# Patient Record
Sex: MALE | Race: WHITE | Employment: FULL TIME | URBAN - METROPOLITAN AREA
[De-identification: names, ages, dates, MRNs, and addresses within clinical notes are randomized per-mention and may not be internally consistent; named-entity substitution may affect disease eponyms.]

---

## 2017-03-30 ENCOUNTER — HOSPITAL ENCOUNTER (OUTPATIENT)
Dept: NON INVASIVE DIAGNOSTICS | Age: 57
Discharge: HOME | End: 2017-03-30
Attending: INTERNAL MEDICINE | Admitting: INTERNAL MEDICINE
Payer: COMMERCIAL

## 2017-03-30 ENCOUNTER — APPOINTMENT (OUTPATIENT)
Dept: GENERAL RADIOLOGY | Age: 57
End: 2017-03-30
Attending: INTERNAL MEDICINE
Payer: COMMERCIAL

## 2017-03-30 ENCOUNTER — ANESTHESIA (OUTPATIENT)
Dept: CARDIAC CATH/INVASIVE PROCEDURES | Age: 57
End: 2017-03-30
Payer: COMMERCIAL

## 2017-03-30 ENCOUNTER — ANESTHESIA EVENT (OUTPATIENT)
Dept: CARDIAC CATH/INVASIVE PROCEDURES | Age: 57
End: 2017-03-30
Payer: COMMERCIAL

## 2017-03-30 VITALS
SYSTOLIC BLOOD PRESSURE: 122 MMHG | DIASTOLIC BLOOD PRESSURE: 74 MMHG | BODY MASS INDEX: 31.69 KG/M2 | WEIGHT: 234 LBS | OXYGEN SATURATION: 94 % | HEART RATE: 60 BPM | TEMPERATURE: 97.9 F | HEIGHT: 72 IN | RESPIRATION RATE: 12 BRPM

## 2017-03-30 PROCEDURE — 74011250636 HC RX REV CODE- 250/636: Performed by: INTERNAL MEDICINE

## 2017-03-30 PROCEDURE — 77030018836 HC SOL IRR NACL ICUM -A

## 2017-03-30 PROCEDURE — 74011250636 HC RX REV CODE- 250/636

## 2017-03-30 PROCEDURE — 77030010507 HC ADH SKN DERMBND J&J -B

## 2017-03-30 PROCEDURE — C1892 INTRO/SHEATH,FIXED,PEEL-AWAY: HCPCS

## 2017-03-30 PROCEDURE — 77030018729 HC ELECTRD DEFIB PAD CARD -B

## 2017-03-30 PROCEDURE — L3650 SO 8 ABD RESTRAINT PRE OTS: HCPCS

## 2017-03-30 PROCEDURE — 77030011640 HC PAD GRND REM COVD -A

## 2017-03-30 PROCEDURE — C1898 LEAD, PMKR, OTHER THAN TRANS: HCPCS

## 2017-03-30 PROCEDURE — 76060000032 HC ANESTHESIA 0.5 TO 1 HR

## 2017-03-30 PROCEDURE — 77030002996 HC SUT SLK J&J -A

## 2017-03-30 PROCEDURE — 71010 XR CHEST PORT: CPT

## 2017-03-30 PROCEDURE — 74011000250 HC RX REV CODE- 250

## 2017-03-30 PROCEDURE — 74011250637 HC RX REV CODE- 250/637: Performed by: INTERNAL MEDICINE

## 2017-03-30 PROCEDURE — L3670 SO ACRO/CLAV CAN WEB PRE OTS: HCPCS

## 2017-03-30 PROCEDURE — 77030031139 HC SUT VCRL2 J&J -A

## 2017-03-30 PROCEDURE — C1894 INTRO/SHEATH, NON-LASER: HCPCS

## 2017-03-30 PROCEDURE — C1785 PMKR, DUAL, RATE-RESP: HCPCS

## 2017-03-30 PROCEDURE — 33208 INSRT HEART PM ATRIAL & VENT: CPT

## 2017-03-30 RX ORDER — CEFAZOLIN SODIUM 1 G/3ML
INJECTION, POWDER, FOR SOLUTION INTRAMUSCULAR; INTRAVENOUS
Status: DISCONTINUED
Start: 2017-03-30 | End: 2017-03-30 | Stop reason: HOSPADM

## 2017-03-30 RX ORDER — MIDAZOLAM HYDROCHLORIDE 1 MG/ML
INJECTION, SOLUTION INTRAMUSCULAR; INTRAVENOUS AS NEEDED
Status: DISCONTINUED | OUTPATIENT
Start: 2017-03-30 | End: 2017-03-30 | Stop reason: HOSPADM

## 2017-03-30 RX ORDER — ACETAMINOPHEN 325 MG/1
650 TABLET ORAL
Status: DISCONTINUED | OUTPATIENT
Start: 2017-03-30 | End: 2017-03-30 | Stop reason: HOSPADM

## 2017-03-30 RX ORDER — BACITRACIN 50000 [IU]/1
50000 INJECTION, POWDER, FOR SOLUTION INTRAMUSCULAR ONCE
Status: COMPLETED | OUTPATIENT
Start: 2017-03-30 | End: 2017-03-30

## 2017-03-30 RX ORDER — LIDOCAINE HYDROCHLORIDE AND EPINEPHRINE 10; 10 MG/ML; UG/ML
1-20 INJECTION, SOLUTION INFILTRATION; PERINEURAL
Status: DISCONTINUED | OUTPATIENT
Start: 2017-03-30 | End: 2017-03-30 | Stop reason: HOSPADM

## 2017-03-30 RX ORDER — MIDAZOLAM HYDROCHLORIDE 1 MG/ML
1-5 INJECTION, SOLUTION INTRAMUSCULAR; INTRAVENOUS
Status: DISCONTINUED | OUTPATIENT
Start: 2017-03-30 | End: 2017-03-30 | Stop reason: HOSPADM

## 2017-03-30 RX ORDER — FENTANYL CITRATE 50 UG/ML
12.5-5 INJECTION, SOLUTION INTRAMUSCULAR; INTRAVENOUS
Status: DISCONTINUED | OUTPATIENT
Start: 2017-03-30 | End: 2017-03-30 | Stop reason: HOSPADM

## 2017-03-30 RX ORDER — SODIUM CHLORIDE 9 MG/ML
INJECTION, SOLUTION INTRAVENOUS
Status: DISCONTINUED | OUTPATIENT
Start: 2017-03-30 | End: 2017-03-30 | Stop reason: HOSPADM

## 2017-03-30 RX ORDER — BACITRACIN 50000 [IU]/1
INJECTION, POWDER, FOR SOLUTION INTRAMUSCULAR
Status: COMPLETED
Start: 2017-03-30 | End: 2017-03-30

## 2017-03-30 RX ORDER — PROPOFOL 10 MG/ML
INJECTION, EMULSION INTRAVENOUS
Status: DISCONTINUED | OUTPATIENT
Start: 2017-03-30 | End: 2017-03-30 | Stop reason: HOSPADM

## 2017-03-30 RX ORDER — HYDROCODONE BITARTRATE AND ACETAMINOPHEN 5; 300 MG/1; MG/1
1 TABLET ORAL
Qty: 8 TAB | Refills: 0 | Status: SHIPPED | OUTPATIENT
Start: 2017-03-30 | End: 2018-09-03

## 2017-03-30 RX ORDER — LIDOCAINE HYDROCHLORIDE AND EPINEPHRINE 10; 10 MG/ML; UG/ML
INJECTION, SOLUTION INFILTRATION; PERINEURAL
Status: COMPLETED
Start: 2017-03-30 | End: 2017-03-30

## 2017-03-30 RX ORDER — CEFAZOLIN SODIUM IN 0.9 % NACL 2 G/100 ML
2 PLASTIC BAG, INJECTION (ML) INTRAVENOUS
Status: COMPLETED | OUTPATIENT
Start: 2017-03-30 | End: 2017-03-30

## 2017-03-30 RX ORDER — HEPARIN SODIUM 200 [USP'U]/100ML
500 INJECTION, SOLUTION INTRAVENOUS ONCE
Status: COMPLETED | OUTPATIENT
Start: 2017-03-30 | End: 2017-03-30

## 2017-03-30 RX ORDER — FENTANYL CITRATE 50 UG/ML
INJECTION, SOLUTION INTRAMUSCULAR; INTRAVENOUS AS NEEDED
Status: DISCONTINUED | OUTPATIENT
Start: 2017-03-30 | End: 2017-03-30 | Stop reason: HOSPADM

## 2017-03-30 RX ORDER — HEPARIN SODIUM 200 [USP'U]/100ML
INJECTION, SOLUTION INTRAVENOUS
Status: COMPLETED
Start: 2017-03-30 | End: 2017-03-30

## 2017-03-30 RX ORDER — HYDROCODONE BITARTRATE AND ACETAMINOPHEN 5; 300 MG/1; MG/1
1 TABLET ORAL
Qty: 8 TAB | Refills: 0 | OUTPATIENT
Start: 2017-03-30 | End: 2017-03-30

## 2017-03-30 RX ADMIN — MIDAZOLAM HYDROCHLORIDE 2 MG: 1 INJECTION, SOLUTION INTRAMUSCULAR; INTRAVENOUS at 13:48

## 2017-03-30 RX ADMIN — FENTANYL CITRATE 25 MCG: 50 INJECTION, SOLUTION INTRAMUSCULAR; INTRAVENOUS at 13:58

## 2017-03-30 RX ADMIN — FENTANYL CITRATE 25 MCG: 50 INJECTION, SOLUTION INTRAMUSCULAR; INTRAVENOUS at 14:05

## 2017-03-30 RX ADMIN — FENTANYL CITRATE 50 MCG: 50 INJECTION, SOLUTION INTRAMUSCULAR; INTRAVENOUS at 14:10

## 2017-03-30 RX ADMIN — BACITRACIN 50000 UNITS: 5000 INJECTION, POWDER, FOR SOLUTION INTRAMUSCULAR at 14:29

## 2017-03-30 RX ADMIN — BACITRACIN 50000 UNITS: 50000 INJECTION, POWDER, FOR SOLUTION INTRAMUSCULAR at 14:29

## 2017-03-30 RX ADMIN — MIDAZOLAM HYDROCHLORIDE 1 MG: 1 INJECTION, SOLUTION INTRAMUSCULAR; INTRAVENOUS at 13:53

## 2017-03-30 RX ADMIN — LIDOCAINE HYDROCHLORIDE,EPINEPHRINE BITARTRATE 30 MG: 10; .01 INJECTION, SOLUTION INFILTRATION; PERINEURAL at 14:10

## 2017-03-30 RX ADMIN — SODIUM CHLORIDE: 9 INJECTION, SOLUTION INTRAVENOUS at 13:44

## 2017-03-30 RX ADMIN — LIDOCAINE HYDROCHLORIDE AND EPINEPHRINE 30 MG: 10; 10 INJECTION, SOLUTION INFILTRATION; PERINEURAL at 14:10

## 2017-03-30 RX ADMIN — PROPOFOL 50 MCG/KG/MIN: 10 INJECTION, EMULSION INTRAVENOUS at 13:53

## 2017-03-30 RX ADMIN — HEPARIN SODIUM 1000 UNITS: 200 INJECTION, SOLUTION INTRAVENOUS at 13:57

## 2017-03-30 RX ADMIN — MIDAZOLAM HYDROCHLORIDE 2 MG: 1 INJECTION, SOLUTION INTRAMUSCULAR; INTRAVENOUS at 14:12

## 2017-03-30 RX ADMIN — CEFAZOLIN 2 G: 10 INJECTION, POWDER, FOR SOLUTION INTRAVENOUS; PARENTERAL at 13:49

## 2017-03-30 RX ADMIN — ACETAMINOPHEN 650 MG: 325 TABLET ORAL at 17:06

## 2017-03-30 NOTE — PROCEDURES
[address]  [phone]    Patient ID:  Patient: Vicente Elliott  MRN: [id number]  Age: 64 y.o.  : 1960  Gender: male  Study Date: 3/30/2017    History: This is a male with symptomatic sinus node dysfunction that is not reversible. He is here for permanent pacemaker implant. Procedures Performed:  1. DUAL CHAMBER PACEMAKER ()    The patient was brought to the EP lab in a postabsorptive state after informed consent had been previously obtained. Continuous electrocardiographic and hemodynamic monitoring was performed. Sedation was performed by the anesthesiologist who was in constant attendance throughout the procedure. Using 1% lidocaine with epinephrine, the left chest site was anesthetized. The pocket was formed in the usual fashion and ultimately axillary venous access was obtained using a micropuncture needle. Two safe sheaths were placed. The right ventricular lead (5086-58 cm) was advanced to the RV apex. The right atrial lead (5086-52 cm) was advanced to the RA appendage. Each lead was fixed and tested, performance verified. The leads were anchored to the pocket floor using two 0-silk sutures at each anchor sleeve. The pulse generator was connected to the leads and placed in the pocket after hemostasis was confirmed. Vigorous irrigation with antibiotic solution was performed. A single 0-silk suture was used to anchor the pulse generator to the pocket floor. The pocket was closed using a running 2-0 Vicryl layer x1, followed by a more superficial layer of running 4-0 Vicryl in a subcuticular fashion to close the skin. Final fluoroscopic check revealed adequate redundancy of the leads and the absence of pneumothorax. Dermabond was applied. Preoperative Diagnosis: As above. Postoperative Diagnosis: As above. Procedure:  As above.   Surgeon(s) and Role:  Oziel Perdomo MD - Primary   Anesthesia:   MAC by the anesthesiologist.  Estimated Blood Loss:  <5 cc. Specimens: * No specimens in log *   Findings:  As below. Complications:  None. SETTINGS:  Medtronic [id number], [id number] (Advisa DR JESSICA Jhaveri)  RA 2.2, 1.0, 935  RV 10.5, 0.6, 1181  AAIR-DDDR     Recommendations:  After successful dual chamber permanent pacemaker implant to the left chest using transvenous leads, routine follow-up in 2-4 weeks for wound and device check. MRI compatibility begins in 6 weeks assuming normal system function.

## 2017-03-30 NOTE — PROGRESS NOTES
TRANSFER - OUT REPORT:    Verbal report given to JANAY Smith(name) on Raffaele Dewitt  being transferred to IVCU(unit) for routine progression of care       Report consisted of patients Situation, Background, Assessment and   Recommendations(SBAR). Information from the following report(s) Procedure Summary and MAR was reviewed with the receiving nurse. Lines:   Peripheral IV 03/30/17 Left Forearm (Active)        Opportunity for questions and clarification was provided.       Patient transported with:   Registered Nurse

## 2017-03-30 NOTE — H&P
[place] [facility], [address]   HISTORY AND PHYSICAL       Name:  Femi Wong   MR#:  [id number]   :  1960   Account #:  [de-identified]        Date of Adm:  2017       CHIEF COMPLAINT: Shortness of breath. HISTORY OF PRESENT ILLNESS: This is a 66-year-old male with a   history of bradycardia, dyslipidemia, active tobacco smoking, and   possible early diabetes mellitus. He was seen in the hospital in   2016 and was noted to have periods of bradycardia. His   resting heart rate was in the 40s beats per minute and with activity   reached up to the 70s beats per minute. At that time he had a right   facial palsy that was confirmed to not be a stroke. This was believed to   be a Bell's palsy. This resolved. He saw me in the office in 2017 and at that time showed   signs that he was having symptomatic bradycardia. A Holter monitor   showed indeed that he was bradycardic and so we made a decision to   pursue a permanent pacemaker. He has continued to have some dyspnea on exertion. Rare dizziness. Sometimes feels a fluctuating sensation in his head. No olya syncope. No chest pain, orthopnea, paroxysmal nocturnal dyspnea, or edema. Sleeps on 1 pillow comfortably. ALLERGIES: NO KNOWN DRUG ALLERGIES. MEDICATIONS: None. PAST MEDICAL HISTORY     1. As above. 2. Slightly elevated transaminases, seen in the past.     FAMILY HISTORY: Noncontributory. SOCIAL HISTORY: Tobacco smoker. . Active at work. REVIEW OF SYSTEMS   A complete review of systems is obtained from the patient, except as   noted above, is otherwise noncontributory. PHYSICAL EXAMINATION   VITAL SIGNS: Pulse 46. Blood pressure 136/69, temperature 98.2   Fahrenheit, respirations 17, oxygen saturation 94% on room air. GENERAL: Nondiaphoretic, not in acute distress, middle-aged male. NECK: Supple, no palpable thyromegaly. HEENT: No scleral icterus, mucous membranes are moist.   Conjunctivae are pink. RESPIRATORY: Unlabored, clear to auscultation bilaterally, symmetric   air movement. CARDIAC: Regular, but bradycardic. No murmur, rub or gallop. No   peripheral edema. Palpable radial pulses bilaterally. ABDOMEN: Soft, nontender, nondistended. EXTREMITIES: No cyanosis or clubbing. NEUROLOGIC: Awake, appropriate, grossly nonfocal. No resting   tremor. TELEMETRY: Sinus bradycardia in the 40s-50s beats per minute   range. IMPRESSION     1. Symptomatic bradycardia, believed to be due to sinus node   dysfunction. This is not reversible. 2. Nicotine dependence, cigarettes. 3. Dyslipidemia. 4. Possible early diabetes mellitus. RECOMMENDATIONS    1. Given the potential benefits, risks, and alternatives, he agrees to   proceed with a dual-chamber permanent pacemaker. 2. Smoking cessation counseling when appropriate. MD Whit Poole / Chuck Turk   D:  03/30/2017   15:00   T:  03/30/2017   15:22   Job #:  [id number]

## 2017-03-30 NOTE — PROGRESS NOTES
Pt ambulated in the lindo with no signs of distress or c/o pain. Pts left chest site dry and intact with no signs of hematoma. Will continue to monitor.

## 2017-03-30 NOTE — PROGRESS NOTES
TRANSFER - IN REPORT:    Verbal report received from Yoly Kirk RN lab(name[facility]  being received from EP lab(unit) for routine progression of care      Report consisted of patients Situation, Background, Assessment and   Recommendations(SBAR). Information from the following report(s) Procedure Summary and MAR was reviewed with the receiving nurse. Opportunity for questions and clarification was provided. Assessment completed upon patients arrival to unit and care assumed.

## 2017-03-30 NOTE — PROGRESS NOTES
Cardiac Cath Lab Recovery Arrival Note:      Timothy Hernández arrived to Cardiac Cath Lab, Recovery Area. Staff introduced to patient. Patient identifiers verified with NAME and DATE OF BIRTH. Procedure verified with patient. Consent forms reviewed and signed by patient or authorized representative and verified. Allergies verified. Patient and family oriented to department. Patient and family informed of procedure and plan of care. Questions answered with review. Patient prepped for procedure, per orders from physician, prior to arrival.    Patient on cardiac monitor, non-invasive blood pressure, SPO2 monitor. On room air. Patient is A&Ox 3. Patient reports no c/o. Patient in stretcher, in low position, with side rails up, call bell within reach, patient instructed to call if assistance as needed. Patient prep in: [address], [place]. Patient family has pager # none  Family in: [facility] waiting area.    Prep by: Hali Dias RN

## 2017-03-30 NOTE — IP AVS SNAPSHOT
[address] [place] [facility]. 
[phone] Patient: Dariel Colmenares MRN: [id number] XQO:[birth year] You are allergic to the following No active allergies Recent Documentation Height Weight BMI Smoking Status 1.829 m 106.1 kg 31.74 kg/m2 Current Every Day Smoker Emergency Contacts Name Discharge Info Relation Home Work Mobile [address] CAREGIVER [3] Spouse [3]   [phone] About your hospitalization You were admitted on:  March 30, 2017 You last received care in the:  [facility] You were discharged on:  March 30, 2017 Unit phone number:  [phone] Why you were hospitalized Your primary diagnosis was:  Not on File Providers Seen During Your Hospitalizations Provider Role Specialty Primary office phone Candice Kumar MD Attending Provider Cardiology [phone] Your Primary Care Physician (PCP) Primary Care Physician Office Phone Office Fax Ashwin Gilbert [phone] Follow-up Information Follow up With Details Comments Contact Info Yani Stearns NP   [address] [address] [phone] Current Discharge Medication List  
  
START taking these medications Dose & Instructions Dispensing Information Comments Morning Noon Evening Bedtime HYDROcodone-acetaminophen 5-300 mg tablet Commonly known as:  Ning Forte Your last dose was: Your next dose is:    
   
   
 Dose:  1 Tab Take 1 Tab by mouth every six (6) hours as needed. Max Daily Amount: 4 Tabs. Quantity:  8 Tab Refills:  0 Where to Get Your Medications Information on where to get these meds will be given to you by the nurse or doctor. ! Ask your nurse or doctor about these medications HYDROcodone-acetaminophen 5-300 mg tablet Discharge Instructions DISCHARGE INSTRUCTIONS FOR PATIENTS WITH PACEMAKERS You had a dual chamber permanent pacemaker (Medtronic) placed by Dr. Ephraim Mercedes on 3/30/2017. This was due to a slow heart rate problem called \"sinus node dysfunction\" that was causing you symptoms. 1. Remember to call for an appointment in 2-4 weeks [phone] to check healing and implant programming with Dr. Claritza Mejias nurse. 2. Medic Alert Bracelets are available from your pharmacist to wear at all times if you choose to wear one. 3. Carry your ID card for pacemaker with you at all times. This card will be given to you in the hospital or mailed to you. 4. The pacemaker will bulge slightly under your skin. The bulge will decrease in size over the next few weeks. Please notify the doctor's office if you notice any of the following around your site: A.  A bruise that does not go away. B.  Soreness or yellow, green, or brown drainage from the site. C. Any swelling from the site. D. If you have a fever of 100 degrees or higher that lasts for a few days. INCISION CARE 1.  Leave skin glue over your site until it starts to fall off, usually in a few weeks. 2.  You may shower after 3 days as long as your incision isnt submerged or directly sprayed upon until well healed. 3.  For comfort, wear loose fitting clothing. 4.  Report any signs of infection, fever, pain, swelling, redness, oozing, or heat at site especially if these symptoms increase after the first 3 to 4 days. ACTIVITY PRECAUTIONS 1. Avoid rough contact with the implant site. 2. No driving for 14 days. 3. Avoid lifting your arm over your head, carrying anything on the affected side, or lifting over 10 pounds for 30 days. For the first 2 days only bend your arm at the elbow. 4. Any extreme activity such as golf, weight lifting or exercise biking should be restricted for 60 days. 5. Do not carry objects by holding them against your implant site. 6.  No shooting rifles or any type of gun with the affected shoulder permanently. SPECIAL PRECAUTIONS 1. You should avoid all strong magnetic fields, such as arc welding, large transformers, large motors in the early healing phase (1st 6 weeks). For any questions regarding specific equipment, [employer] technical support (7-540-HUWFQIMXH) can help answer questions. 2.  You may not have an MRI which uses a strong magnet to take pictures for at least 6 weeks. After normal function of the system is confirmed, an MRI scan is acceptable in most cases. 3.  Treatments or surgery that requires diathermy or electrocautery should be discussed with your doctor before scheduled. 4. Avoid radio frequency transmitters, including radar. 5. Advise dentist or other medical personnel you see that you have a pacemaker. 6.  Cell phones and microwave oven use is okay. 7.  If you plan to move or take a trip to a new area, the doctor's office will give you a name of a doctor to contact for any problems. ANTIBIOTIC THERAPY During the first 8 weeks after your pacemaker insertion, you may need antibiotics before any dental work or certain tests or operations. Let the dentist or doctor who is caring for you know that you have had an implanted device. Discharge Orders None Introducing [facility]! [facility] introduces uBank patient portal. Now you can access parts of your medical record, email your doctor's office, and request medication refills online. 1. In your internet browser, go to [email or web address] 2. Click on the First Time User? Click Here link in the Sign In box. You will see the New Member Sign Up page. 3. Enter your uBank Access Code exactly as it appears below. You will not need to use this code after youve completed the sign-up process.  If you do not sign up before the expiration date, you must request a new code. · beatlab Access Code: [email or web address] Expires: 6/6/2017  9:15 AM 
 
4. Enter the last four digits of your Social Security Number (xxxx) and Date of Birth (mm/dd/yyyy) as indicated and click Submit. You will be taken to the next sign-up page. 5. Create a beatlab ID. This will be your beatlab login ID and cannot be changed, so think of one that is secure and easy to remember. 6. Create a beatlab password. You can change your password at any time. 7. Enter your Password Reset Question and Answer. This can be used at a later time if you forget your password. 8. Enter your e-mail address. You will receive e-mail notification when new information is available in [address] 9. Click Sign Up. You can now view and download portions of your medical record. 10. Click the Download Summary menu link to download a portable copy of your medical information. If you have questions, please visit the Frequently Asked Questions section of the beatlab website. Remember, beatlab is NOT to be used for urgent needs. For medical emergencies, dial 911. Now available from your iPhone and Android! General Information Please provide this summary of care documentation to your next provider. Patient Signature:  ____________________________________________________________ Date:  ____________________________________________________________  
  
[place] Provider Signature:  ____________________________________________________________ Date:  ____________________________________________________________

## 2017-03-30 NOTE — DISCHARGE INSTRUCTIONS
DISCHARGE INSTRUCTIONS FOR PATIENTS WITH PACEMAKERS    You had a dual chamber permanent pacemaker (Medtronic) placed by Dr. Galileo Garcia on 3/30/2017. This was due to a slow heart rate problem called \"sinus node dysfunction\" that was causing you symptoms. 1. Remember to call for an appointment in 2-4 weeks [phone] to check healing and implant programming with Dr. Hugo Jacinto nurse. 2. Medic Alert Bracelets are available from your pharmacist to wear at all times if you choose to wear one. 3. Carry your ID card for pacemaker with you at all times. This card will be given to you in the hospital or mailed to you. 4. The pacemaker will bulge slightly under your skin. The bulge will decrease in size over the next few weeks. Please notify the doctor's office if you notice any of the following around your site:   A.  A bruise that does not go away. B.  Soreness or yellow, green, or brown drainage from the site. C. Any swelling from the site. D. If you have a fever of 100 degrees or higher that lasts for a few days. INCISION CARE       1.  Leave skin glue over your site until it starts to fall off, usually in a few weeks. 2.  You may shower after 3 days as long as your incision isnt submerged or directly sprayed upon until well healed. 3.  For comfort, wear loose fitting clothing. 4.  Report any signs of infection, fever, pain, swelling, redness, oozing, or heat at site especially if these symptoms increase after the first 3 to 4 days. ACTIVITY PRECAUTIONS     1. Avoid rough contact with the implant site. 2. No driving for 14 days. 3. Avoid lifting your arm over your head, carrying anything on the affected side, or lifting over 10 pounds for 30 days. For the first 2 days only bend your arm at the elbow. 4. Any extreme activity such as golf, weight lifting or exercise biking should be restricted for 60 days. 5. Do not carry objects by holding them against your implant site.    6.  No shooting rifles or any type of gun with the affected shoulder permanently. SPECIAL PRECAUTIONS     1. You should avoid all strong magnetic fields, such as arc welding, large transformers, large motors in the early healing phase (1st 6 weeks). For any questions regarding specific equipment, [employer] technical support (2-625-NTHOXGPBF) can help answer questions. 2.  You may not have an MRI which uses a strong magnet to take pictures for at least 6 weeks. After normal function of the system is confirmed, an MRI scan is acceptable in most cases. 3.  Treatments or surgery that requires diathermy or electrocautery should be discussed with your doctor before scheduled. 4. Avoid radio frequency transmitters, including radar. 5. Advise dentist or other medical personnel you see that you have a pacemaker. 6.  Cell phones and microwave oven use is okay. 7.  If you plan to move or take a trip to a new area, the doctor's office will give you a name of a doctor to contact for any problems. ANTIBIOTIC THERAPY    During the first 8 weeks after your pacemaker insertion, you may need antibiotics before any dental work or certain tests or operations. Let the dentist or doctor who is caring for you know that you have had an implanted device.

## 2017-03-30 NOTE — ANESTHESIA POSTPROCEDURE EVALUATION
Post-Anesthesia Evaluation and Assessment    Patient: Charis Lerma MRN: [id number]  SSN: [id number]    YOB: 1960  Age: 64 y.o. Sex: male       Cardiovascular Function/Vital Signs  Visit Vitals    /66    Pulse 68    Temp 36.8 °C (98.2 °F)    Resp 18    Ht 6' (1.829 m)    Wt 106.1 kg (234 lb)    SpO2 99%    BMI 31.74 kg/m2       Patient is status post total IV anesthesia, general anesthesia for * No procedures listed *. Nausea/Vomiting: None    Postoperative hydration reviewed and adequate. Pain:  Pain Scale 1: Numeric (0 - 10) (03/30/17 1304)  Pain Intensity 1: 0 (03/30/17 1304)   Managed    Neurological Status: At baseline    Mental Status and Level of Consciousness: Arousable    Pulmonary Status:   O2 Device: Nasal cannula (03/30/17 1441)   Adequate oxygenation and airway patent    Complications related to anesthesia: None    Post-anesthesia assessment completed.  No concerns    Signed By: Win Dean MD     March 30, 2017

## 2017-03-30 NOTE — PROGRESS NOTES
Discharge instructions given and gone over with pt. All questions answered. PIV and tele removed. Pts left chest site dry and intact with no signs of hematoma. Pt left with Rx and with all belongings.

## 2017-03-30 NOTE — ANESTHESIA PREPROCEDURE EVALUATION
Anesthetic History   No history of anesthetic complications            Review of Systems / Medical History  Patient summary reviewed, nursing notes reviewed and pertinent labs reviewed    Pulmonary          Smoker         Neuro/Psych           Pertinent negatives: No TIA   Cardiovascular  Within defined limits                Exercise tolerance: >4 METS  Comments: bradycardia   GI/Hepatic/Renal  Within defined limits              Endo/Other        Obesity     Other Findings   Comments: 2.0 oz EtOH per week      H/o Bell's palsy, NOT TIA         Physical Exam    Airway  Mallampati: III  TM Distance: 4 - 6 cm  Neck ROM: normal range of motion   Mouth opening: Normal     Cardiovascular  Regular rate and rhythm,  S1 and S2 normal,  no murmur, click, rub, or gallop             Dental         Pulmonary  Breath sounds clear to auscultation               Abdominal  GI exam deferred       Other Findings            Anesthetic Plan    ASA: 2  Anesthesia type: MAC            Anesthetic plan and risks discussed with: Patient

## 2018-09-03 ENCOUNTER — APPOINTMENT (OUTPATIENT)
Dept: CT IMAGING | Age: 58
End: 2018-09-03
Attending: EMERGENCY MEDICINE
Payer: COMMERCIAL

## 2018-09-03 ENCOUNTER — HOSPITAL ENCOUNTER (EMERGENCY)
Age: 58
Discharge: ANOTHER HOSPITAL | End: 2018-09-03
Attending: EMERGENCY MEDICINE
Payer: COMMERCIAL

## 2018-09-03 VITALS
WEIGHT: 230 LBS | HEIGHT: 73 IN | SYSTOLIC BLOOD PRESSURE: 133 MMHG | TEMPERATURE: 98.6 F | DIASTOLIC BLOOD PRESSURE: 98 MMHG | HEART RATE: 91 BPM | OXYGEN SATURATION: 93 % | RESPIRATION RATE: 16 BRPM | BODY MASS INDEX: 30.48 KG/M2

## 2018-09-03 DIAGNOSIS — K57.20 DIVERTICULITIS OF LARGE INTESTINE WITH PERFORATION WITHOUT BLEEDING: Primary | ICD-10-CM

## 2018-09-03 LAB
ALBUMIN SERPL-MCNC: 3.6 G/DL (ref 3.5–5)
ALBUMIN/GLOB SERPL: 0.9 {RATIO} (ref 1.1–2.2)
ALP SERPL-CCNC: 79 U/L (ref 45–117)
ALT SERPL-CCNC: 51 U/L (ref 12–78)
ANION GAP SERPL CALC-SCNC: 10 MMOL/L (ref 5–15)
AST SERPL-CCNC: 17 U/L (ref 15–37)
BASOPHILS # BLD: 0 K/UL (ref 0–0.1)
BASOPHILS NFR BLD: 0 % (ref 0–1)
BILIRUB SERPL-MCNC: 1.3 MG/DL (ref 0.2–1)
BUN SERPL-MCNC: 13 MG/DL (ref 6–20)
BUN/CREAT SERPL: 11 (ref 12–20)
CALCIUM SERPL-MCNC: 9.6 MG/DL (ref 8.5–10.1)
CHLORIDE SERPL-SCNC: 104 MMOL/L (ref 97–108)
CO2 SERPL-SCNC: 22 MMOL/L (ref 21–32)
CREAT BLD-MCNC: 1.1 MG/DL (ref 0.6–1.3)
CREAT SERPL-MCNC: 1.17 MG/DL (ref 0.7–1.3)
DIFFERENTIAL METHOD BLD: ABNORMAL
EOSINOPHIL # BLD: 0 K/UL (ref 0–0.4)
EOSINOPHIL NFR BLD: 0 % (ref 0–7)
ERYTHROCYTE [DISTWIDTH] IN BLOOD BY AUTOMATED COUNT: 13 % (ref 11.5–14.5)
GLOBULIN SER CALC-MCNC: 4.1 G/DL (ref 2–4)
GLUCOSE SERPL-MCNC: 157 MG/DL (ref 65–100)
HCT VFR BLD AUTO: 47.2 % (ref 36.6–50.3)
HGB BLD-MCNC: 16.1 G/DL (ref 12.1–17)
IMM GRANULOCYTES # BLD: 0.1 K/UL (ref 0–0.04)
IMM GRANULOCYTES NFR BLD AUTO: 1 % (ref 0–0.5)
LACTATE SERPL-SCNC: 1.2 MMOL/L (ref 0.4–2)
LIPASE SERPL-CCNC: 67 U/L (ref 73–393)
LYMPHOCYTES # BLD: 2.3 K/UL (ref 0.8–3.5)
LYMPHOCYTES NFR BLD: 12 % (ref 12–49)
MCH RBC QN AUTO: 31.3 PG (ref 26–34)
MCHC RBC AUTO-ENTMCNC: 34.1 G/DL (ref 30–36.5)
MCV RBC AUTO: 91.8 FL (ref 80–99)
MONOCYTES # BLD: 1.2 K/UL (ref 0–1)
MONOCYTES NFR BLD: 7 % (ref 5–13)
NEUTS SEG # BLD: 14.8 K/UL (ref 1.8–8)
NEUTS SEG NFR BLD: 80 % (ref 32–75)
NRBC # BLD: 0 K/UL (ref 0–0.01)
NRBC BLD-RTO: 0 PER 100 WBC
PLATELET # BLD AUTO: 246 K/UL (ref 150–400)
PMV BLD AUTO: 10.5 FL (ref 8.9–12.9)
POTASSIUM SERPL-SCNC: 4.2 MMOL/L (ref 3.5–5.1)
PROT SERPL-MCNC: 7.7 G/DL (ref 6.4–8.2)
RBC # BLD AUTO: 5.14 M/UL (ref 4.1–5.7)
SODIUM SERPL-SCNC: 136 MMOL/L (ref 136–145)
WBC # BLD AUTO: 18.5 K/UL (ref 4.1–11.1)

## 2018-09-03 PROCEDURE — 83690 ASSAY OF LIPASE: CPT | Performed by: EMERGENCY MEDICINE

## 2018-09-03 PROCEDURE — 96375 TX/PRO/DX INJ NEW DRUG ADDON: CPT

## 2018-09-03 PROCEDURE — 87040 BLOOD CULTURE FOR BACTERIA: CPT | Performed by: EMERGENCY MEDICINE

## 2018-09-03 PROCEDURE — 74177 CT ABD & PELVIS W/CONTRAST: CPT

## 2018-09-03 PROCEDURE — 74011636320 HC RX REV CODE- 636/320: Performed by: EMERGENCY MEDICINE

## 2018-09-03 PROCEDURE — 96367 TX/PROPH/DG ADDL SEQ IV INF: CPT

## 2018-09-03 PROCEDURE — 80053 COMPREHEN METABOLIC PANEL: CPT | Performed by: EMERGENCY MEDICINE

## 2018-09-03 PROCEDURE — 85025 COMPLETE CBC W/AUTO DIFF WBC: CPT | Performed by: EMERGENCY MEDICINE

## 2018-09-03 PROCEDURE — 74011250636 HC RX REV CODE- 250/636: Performed by: EMERGENCY MEDICINE

## 2018-09-03 PROCEDURE — 99285 EMERGENCY DEPT VISIT HI MDM: CPT

## 2018-09-03 PROCEDURE — 82565 ASSAY OF CREATININE: CPT

## 2018-09-03 PROCEDURE — 96376 TX/PRO/DX INJ SAME DRUG ADON: CPT

## 2018-09-03 PROCEDURE — 83605 ASSAY OF LACTIC ACID: CPT | Performed by: EMERGENCY MEDICINE

## 2018-09-03 PROCEDURE — 96361 HYDRATE IV INFUSION ADD-ON: CPT

## 2018-09-03 PROCEDURE — 99284 EMERGENCY DEPT VISIT MOD MDM: CPT

## 2018-09-03 PROCEDURE — 99283 EMERGENCY DEPT VISIT LOW MDM: CPT

## 2018-09-03 PROCEDURE — 96365 THER/PROPH/DIAG IV INF INIT: CPT

## 2018-09-03 PROCEDURE — 36415 COLL VENOUS BLD VENIPUNCTURE: CPT | Performed by: EMERGENCY MEDICINE

## 2018-09-03 RX ORDER — MORPHINE SULFATE 2 MG/ML
4 INJECTION, SOLUTION INTRAMUSCULAR; INTRAVENOUS
Status: COMPLETED | OUTPATIENT
Start: 2018-09-03 | End: 2018-09-03

## 2018-09-03 RX ORDER — SODIUM CHLORIDE 9 MG/ML
125 INJECTION, SOLUTION INTRAVENOUS CONTINUOUS
Status: DISCONTINUED | OUTPATIENT
Start: 2018-09-03 | End: 2018-09-03 | Stop reason: HOSPADM

## 2018-09-03 RX ORDER — LEVOFLOXACIN 5 MG/ML
750 INJECTION, SOLUTION INTRAVENOUS
Status: COMPLETED | OUTPATIENT
Start: 2018-09-03 | End: 2018-09-03

## 2018-09-03 RX ORDER — ONDANSETRON 2 MG/ML
4 INJECTION INTRAMUSCULAR; INTRAVENOUS
Status: COMPLETED | OUTPATIENT
Start: 2018-09-03 | End: 2018-09-03

## 2018-09-03 RX ORDER — SODIUM CHLORIDE 0.9 % (FLUSH) 0.9 %
10 SYRINGE (ML) INJECTION
Status: COMPLETED | OUTPATIENT
Start: 2018-09-03 | End: 2018-09-03

## 2018-09-03 RX ORDER — METRONIDAZOLE 500 MG/100ML
500 INJECTION, SOLUTION INTRAVENOUS
Status: COMPLETED | OUTPATIENT
Start: 2018-09-03 | End: 2018-09-03

## 2018-09-03 RX ORDER — SODIUM CHLORIDE 9 MG/ML
50 INJECTION, SOLUTION INTRAVENOUS
Status: COMPLETED | OUTPATIENT
Start: 2018-09-03 | End: 2018-09-03

## 2018-09-03 RX ORDER — MORPHINE SULFATE 2 MG/ML
6 INJECTION, SOLUTION INTRAMUSCULAR; INTRAVENOUS
Status: COMPLETED | OUTPATIENT
Start: 2018-09-03 | End: 2018-09-03

## 2018-09-03 RX ADMIN — ONDANSETRON 4 MG: 2 INJECTION INTRAMUSCULAR; INTRAVENOUS at 09:42

## 2018-09-03 RX ADMIN — Medication 10 ML: at 10:04

## 2018-09-03 RX ADMIN — SODIUM CHLORIDE 1000 ML: 900 INJECTION, SOLUTION INTRAVENOUS at 09:42

## 2018-09-03 RX ADMIN — MORPHINE SULFATE 4 MG: 2 INJECTION, SOLUTION INTRAMUSCULAR; INTRAVENOUS at 11:30

## 2018-09-03 RX ADMIN — IOPAMIDOL 100 ML: 755 INJECTION, SOLUTION INTRAVENOUS at 10:04

## 2018-09-03 RX ADMIN — SODIUM CHLORIDE 125 ML/HR: 900 INJECTION, SOLUTION INTRAVENOUS at 12:13

## 2018-09-03 RX ADMIN — SODIUM CHLORIDE 50 ML/HR: 900 INJECTION, SOLUTION INTRAVENOUS at 10:04

## 2018-09-03 RX ADMIN — METRONIDAZOLE 500 MG: 500 INJECTION, SOLUTION INTRAVENOUS at 11:36

## 2018-09-03 RX ADMIN — MORPHINE SULFATE 6 MG: 2 INJECTION, SOLUTION INTRAMUSCULAR; INTRAVENOUS at 09:44

## 2018-09-03 RX ADMIN — LEVOFLOXACIN 750 MG: 5 INJECTION, SOLUTION INTRAVENOUS at 12:34

## 2018-09-03 NOTE — ED PROVIDER NOTES
EMERGENCY DEPARTMENT HISTORY AND PHYSICAL EXAM 
 
 
Date: 9/3/2018 Patient Name: Jan Canseco History of Presenting Illness Chief Complaint Patient presents with  Abdominal Pain Low mid abd pain x last night History Provided By: Patient HPI: Jan Canseco, 62 y.o. male with no pertinent PMHx, presents ambulatory to the ED with cc of new onset of constant, sharp, stabbing, diffuse lower abdominal pain greatest in the LLQ progressively worsening x yesterday. Pt reports associated sx of a 100.5F fever, diarrhea, and hot and cold flashes as well. He expresses his discomfort is exacerbated with movement and palpation of the LLQ and has found no alleviating factors leading him to the ED for evaluation. He notes his LBM to be en route to the ED. Pt discloses a h/o appendectomy but denies any other GI surgeries. Pt denies any h/o diverticulitis. He denies any chest pain, SOB, cough, abdominal distention, nausea, vomiting, hematochezia, melena, dysuria, or hematuria. There are no other complaints, changes, or physical findings at this time. PCP: Sulma Howe NP  
SHx: (+) Tobacco: 1ppd; (+) ETOH; (-) Illicit drug use Current Outpatient Prescriptions Medication Sig Dispense Refill  
 HYDROcodone-acetaminophen (VICODIN) 5-300 mg tablet Take 1 Tab by mouth every six (6) hours as needed. Max Daily Amount: 4 Tabs. 8 Tab 0 Past History Past Medical History: 
Past Medical History:  
Diagnosis Date  Bradycardia Past Surgical History: 
Past Surgical History:  
Procedure Laterality Date  HX APPENDECTOMY Family History: 
Family History Problem Relation Age of Onset  Diabetes Maternal Grandmother Social History: 
Social History Substance Use Topics  Smoking status: Current Every Day Smoker Packs/day: 1.00  Smokeless tobacco: Never Used  Alcohol use 2.0 oz/week 4 Cans of beer per week Allergies: 
No Known Allergies Review of Systems Review of Systems Constitutional: Positive for fever (100.5F). Negative for fatigue.  
     (+) hot / cold flashes HENT: Negative for congestion, rhinorrhea and sore throat. Eyes: Negative for pain, discharge and visual disturbance. Respiratory: Negative for cough, chest tightness, shortness of breath and wheezing. Cardiovascular: Negative for chest pain, palpitations and leg swelling. Gastrointestinal: Positive for abdominal pain (lower abdomen ). Negative for abdominal distention, blood in stool, constipation, diarrhea, nausea and vomiting. Genitourinary: Negative for dysuria, frequency and hematuria. Musculoskeletal: Negative for arthralgias, back pain and myalgias. Skin: Negative for rash. Neurological: Negative for dizziness, weakness, light-headedness and headaches. Psychiatric/Behavioral: Negative. Physical Exam  
Physical Exam  
Constitutional: He is oriented to person, place, and time. He appears well-developed and well-nourished. He appears distressed (mild ). Appears uncomfortable HENT:  
Head: Normocephalic and atraumatic. Eyes: EOM are normal. Right eye exhibits no discharge. Left eye exhibits no discharge. No scleral icterus. Neck: Normal range of motion. Neck supple. No tracheal deviation present. Cardiovascular: Normal rate, regular rhythm, normal heart sounds and intact distal pulses. Exam reveals no gallop and no friction rub. No murmur heard. Pulmonary/Chest: Effort normal. No respiratory distress. He has wheezes (scattered ). He has no rales. Abdominal: Soft. He exhibits distension. There is generalized tenderness. There is rebound and guarding. Musculoskeletal: Normal range of motion. He exhibits no edema. Lymphadenopathy:  
  He has no cervical adenopathy. Neurological: He is alert and oriented to person, place, and time. Skin: Skin is warm and dry. No rash noted. Psychiatric: He has a normal mood and affect. Nursing note and vitals reviewed. Diagnostic Study Results Labs - Recent Results (from the past 12 hour(s)) POC CREATININE Collection Time: 09/03/18  9:18 AM  
Result Value Ref Range Creatinine (POC) 1.1 0.6 - 1.3 mg/dL GFRAA, POC >60 >60 ml/min/1.73m2 GFRNA, POC >60 >60 ml/min/1.73m2 CBC WITH AUTOMATED DIFF Collection Time: 09/03/18  9:24 AM  
Result Value Ref Range WBC 18.5 (H) 4.1 - 11.1 K/uL  
 RBC 5.14 4.10 - 5.70 M/uL  
 HGB 16.1 12.1 - 17.0 g/dL HCT 47.2 36.6 - 50.3 % MCV 91.8 80.0 - 99.0 FL  
 MCH 31.3 26.0 - 34.0 PG  
 MCHC 34.1 30.0 - 36.5 g/dL  
 RDW 13.0 11.5 - 14.5 % PLATELET 500 864 - 410 K/uL MPV 10.5 8.9 - 12.9 FL  
 NRBC 0.0 0  WBC ABSOLUTE NRBC 0.00 0.00 - 0.01 K/uL NEUTROPHILS 80 (H) 32 - 75 % LYMPHOCYTES 12 12 - 49 % MONOCYTES 7 5 - 13 % EOSINOPHILS 0 0 - 7 % BASOPHILS 0 0 - 1 % IMMATURE GRANULOCYTES 1 (H) 0.0 - 0.5 % ABS. NEUTROPHILS 14.8 (H) 1.8 - 8.0 K/UL  
 ABS. LYMPHOCYTES 2.3 0.8 - 3.5 K/UL  
 ABS. MONOCYTES 1.2 (H) 0.0 - 1.0 K/UL  
 ABS. EOSINOPHILS 0.0 0.0 - 0.4 K/UL  
 ABS. BASOPHILS 0.0 0.0 - 0.1 K/UL  
 ABS. IMM. GRANS. 0.1 (H) 0.00 - 0.04 K/UL  
 DF AUTOMATED METABOLIC PANEL, COMPREHENSIVE Collection Time: 09/03/18  9:24 AM  
Result Value Ref Range Sodium 136 136 - 145 mmol/L Potassium 4.2 3.5 - 5.1 mmol/L Chloride 104 97 - 108 mmol/L  
 CO2 22 21 - 32 mmol/L Anion gap 10 5 - 15 mmol/L Glucose 157 (H) 65 - 100 mg/dL BUN 13 6 - 20 MG/DL Creatinine 1.17 0.70 - 1.30 MG/DL  
 BUN/Creatinine ratio 11 (L) 12 - 20 GFR est AA >60 >60 ml/min/1.73m2 GFR est non-AA >60 >60 ml/min/1.73m2 Calcium 9.6 8.5 - 10.1 MG/DL Bilirubin, total 1.3 (H) 0.2 - 1.0 MG/DL  
 ALT (SGPT) 51 12 - 78 U/L  
 AST (SGOT) 17 15 - 37 U/L Alk. phosphatase 79 45 - 117 U/L Protein, total 7.7 6.4 - 8.2 g/dL Albumin 3.6 3.5 - 5.0 g/dL Globulin 4.1 (H) 2.0 - 4.0 g/dL A-G Ratio 0.9 (L) 1.1 - 2.2 LACTIC ACID Collection Time: 09/03/18  9:24 AM  
Result Value Ref Range Lactic acid 1.2 0.4 - 2.0 MMOL/L  
LIPASE Collection Time: 09/03/18  9:24 AM  
Result Value Ref Range Lipase 67 (L) 73 - 393 U/L Radiologic Studies -  
CT Results  (Last 48 hours) 09/03/18 1001  CT ABD PELV W CONT Final result Impression:  IMPRESSION:   
1. Short segment of mural thickening with adjacent stranding and inflammatory  
change in the sigmoid colon in an area of diverticulosis with a small,  
perforation and no significant drainable collection. Findings are most  
suggestive of diverticulitis. An underlying lesion cannot be excluded;  
therefore, recommend follow-up to ensure complete resolution of the mural  
thickening. 2. There are air-fluid levels in the cecum, ascending colon and hepatic flexure  
suggesting the possibility of diarrhea. 3. Incidental findings as above Narrative:  EXAM:  CT ABDOMEN PELVIS WITH CONTRAST INDICATION:  lower ab pain worsening since last night COMPARISON: None. .  
TECHNIQUE:   
Multislice helical CT was performed from the diaphragm to the symphysis pubis  
with oral and intravenous contrast administration. Contiguous 5 mm axial images  
were reconstructed and lung and soft tissue windows were generated. Coronal and  
sagittal reformations were generated. CT dose reduction was achieved through use of a standardized protocol tailored  
for this examination and automatic exposure control for dose modulation. [facility] FINDINGS:  
INCIDENTALLY IMAGED CHEST:  
Heart/vessels: Cardiac leads in the right atrium and right ventricle. Lungs/Pleura: Within normal limits. Carina Barrio ABDOMEN:  
Liver: Diffuse, diminished attenuation throughout the liver relative to the  
spleen. Gallbladder/Biliary: Within normal limits.   
Spleen: Tiny, ill-defined very low attenuation lesions in the spleen which  
 cannot be accurately characterized, possibly representing angiomyolipomas. Pancreas: Within normal limits. Adrenals: Within normal limits. Kidneys: Within normal limits. Peritoneum/Mesenteries: Intimal stranding in the mesentery supplying the sigmoid  
colon. Extraperitoneum: Irregular collection of gas and a small amount of fluid  
surrounding the abnormal sigmoid colon and the pelvic inlet. Gastrointestinal tract: Minimal diverticulosis in the sigmoid colon. There is  
stranding and inflammatory changes adjacent to the sigmoid colon at the pelvic  
inlet with associated free locules of gas adjacent to the sigmoid colon. There  
is a short segment of mural thickening associated with this abnormal sigmoid  
colon. There are air-fluid levels in the cecum, ascending colon and hepatic  
flexure Vascular: Calcifications in the aorta. Peggyann Gang PELVIS:  
Extraperitoneum: Punctate calculi in the floor the pelvis suggesting  
phleboliths. Ureters: Within normal limits. Bladder: Decompressed and unremarkable. Reproductive System: Calculi in the prostate. .  
. MSK:   
Nonfocal degenerative changes in the spine. Degenerative disc disease at L5/S1. .  
  
  
 
 
 
 
Medical Decision Making I am the first provider for this patient. I reviewed the vital signs, available nursing notes, past medical history, past surgical history, family history and social history. Vital Signs-Reviewed the patient's vital signs. Patient Vitals for the past 12 hrs: 
 Temp Pulse Resp BP SpO2  
09/03/18 1230 - - - 150/83 92 % 09/03/18 1100 - - - 100/78 94 % 09/03/18 1030 - - - 156/85 92 % 09/03/18 0945 - - - 145/68 95 % 09/03/18 0930 - - - - 93 % 09/03/18 0920 - - - 156/88 -  
09/03/18 0916 98.3 °F (36.8 °C) 89 18 156/88 94 % Pulse Oximetry Analysis - 94% on room air Cardiac Monitor:  
Rate: 89 bpm 
Rhythm: Normal Sinus Rhythm Records Reviewed: Nursing Notes, Old Medical Records, Previous Radiology Studies and Previous Laboratory Studies Provider Notes (Medical Decision Making): DDx: Perforation, Diverticulitis, Colitis, Obstruction, Ileus, Nephrolithiasis, UTI, Pancreatitis. Disposition: 62year old male presents to ED with worsening lower abd x yesterday. WBC 18.5, lactate 1.2. CT abdomen pelvis consistent with diverticulitis with small perforation without abscess. Will tx with IV fluids, levo, flagyl. Will transfer to [facility] since he is a [facility] pt. ED Course:  
Initial assessment performed. The patients presenting problems have been discussed, and they are in agreement with the care plan formulated and outlined with them. I have encouraged them to ask questions as they arise throughout their visit. Progress Notes: 
 
9:56 AM  
The pt has been re-evaluated. CT has been given POC creatinine and advised to take pt to CT immediately given concern for perforation. CONSULT NOTE: 
11:03 AM 
Rachell Camarillo MD spoke with Dr. Cyndi Braxton, Specialty: General surgery Discussed pt's hx, disposition, and available diagnostic and imaging results. Reviewed care plans. Consultant recommends he will admit the pt to the hospital for management. Written by Dorie Reed ED Scribe, as dictated by Rachell Camarillo MD 
 
11:04 AM  
Pt is a [facility] pt will transfer to [address] [place] [address] with transfer center has auto-accepted pt. Will set up transport and call back with accepting physician. 11:12 AM  
The pt has been re-evaluated. Cyndi Braxton MD has been notified regarding pt transfer. 11:25 AM 
The pt has been re-evaluated. Pt has been accepted by Dr. Manda Rodríguez at [facility].  
 
12:51 PM 
LAST LOOK: 
Visit Vitals  /83  Pulse 89  Temp 98.3 °F (36.8 °C)  Resp 18  Ht 6' 1\" (1.854 m)  Wt 104.3 kg (230 lb)  SpO2 92%  BMI 30.34 kg/m2 Just prior to transfer, I re-evaluated the patient. Vitals reviewed and patient/family given a chance to ask questions. All agree with the plan to transfer. At this time, I feel that the pt is stable for transfer. Critical Care Time: 0 minutes Disposition: 
Transfer Note: 
12:38 PM 
Patient is being transferred to [facility], transfer accepted by Dr. David Santiago. The reasons for patient's transfer have been discussed with the patient and available family. They convey agreement and understanding for the need to be transferred as explained to them by Gil Mcfarland MD. 
 
PLAN: 
1. Transfer to [facility] 
 
Diagnosis Clinical Impression: 1. Diverticulitis of large intestine with perforation without bleeding Attestations: 
 
Attestation: This note is prepared by Jaime Reed, acting as Scribe for Gil Mcfarland MD. 
 
 
Gil Mcfarland MD: The scribe's documentation has been prepared under my direction and personally reviewed by me in its entirety. I confirm that the note above accurately reflects all work, treatment, procedures, and medical decision making performed by me.

## 2018-09-03 NOTE — ED NOTES
Pt arrives from triage, co abdominal pain in lower half of abdomen, started yesterday, pt having small amounts of diarrhea, denies CP/SOB, denies vominting, pt placed on monitor x2.

## 2018-09-03 NOTE — ED NOTES
Infusions stopped in chart however patient sent with running normal saline and levaqin to [facility].

## 2018-09-03 NOTE — ED NOTES
[facility], will arrange transport ER to ER.

## 2018-09-09 LAB
BACTERIA SPEC CULT: NORMAL
BACTERIA SPEC CULT: NORMAL
SERVICE CMNT-IMP: NORMAL
SERVICE CMNT-IMP: NORMAL

## 2019-09-30 ENCOUNTER — HOSPITAL ENCOUNTER (OUTPATIENT)
Dept: GENERAL RADIOLOGY | Age: 59
Discharge: HOME | End: 2019-09-30
Payer: COMMERCIAL

## 2019-09-30 DIAGNOSIS — R05.9 COUGH: ICD-10-CM

## 2019-09-30 PROCEDURE — 71046 X-RAY EXAM CHEST 2 VIEWS: CPT
